# Patient Record
Sex: FEMALE | Race: WHITE | Employment: OTHER | URBAN - METROPOLITAN AREA
[De-identification: names, ages, dates, MRNs, and addresses within clinical notes are randomized per-mention and may not be internally consistent; named-entity substitution may affect disease eponyms.]

---

## 2020-04-25 ENCOUNTER — APPOINTMENT (OUTPATIENT)
Dept: GENERAL RADIOLOGY | Age: 75
End: 2020-04-25
Payer: MEDICARE

## 2020-04-25 ENCOUNTER — HOSPITAL ENCOUNTER (EMERGENCY)
Age: 75
Discharge: HOME | End: 2020-04-25
Attending: EMERGENCY MEDICINE
Payer: MEDICARE

## 2020-04-25 ENCOUNTER — APPOINTMENT (OUTPATIENT)
Dept: CT IMAGING | Age: 75
End: 2020-04-25
Payer: MEDICARE

## 2020-04-25 VITALS
DIASTOLIC BLOOD PRESSURE: 86 MMHG | WEIGHT: 220 LBS | RESPIRATION RATE: 14 BRPM | SYSTOLIC BLOOD PRESSURE: 145 MMHG | HEIGHT: 70 IN | OXYGEN SATURATION: 94 % | TEMPERATURE: 98 F | HEART RATE: 91 BPM | BODY MASS INDEX: 31.5 KG/M2

## 2020-04-25 LAB
ABSOLUTE EOS #: 0.15 K/UL (ref 0–0.44)
ABSOLUTE IMMATURE GRANULOCYTE: 0.02 K/UL (ref 0–0.3)
ABSOLUTE LYMPH #: 1.5 K/UL (ref 1.1–3.7)
ABSOLUTE MONO #: 0.61 K/UL (ref 0.1–1.2)
ALBUMIN SERPL-MCNC: 3.4 G/DL (ref 3.5–5.2)
ALBUMIN/GLOBULIN RATIO: ABNORMAL (ref 1–2.5)
ALP BLD-CCNC: 230 U/L (ref 35–104)
ALT SERPL-CCNC: 59 U/L (ref 5–33)
ANION GAP SERPL CALCULATED.3IONS-SCNC: 12 MMOL/L (ref 9–17)
AST SERPL-CCNC: 94 U/L
BASOPHILS # BLD: 1 % (ref 0–2)
BASOPHILS ABSOLUTE: 0.08 K/UL (ref 0–0.2)
BILIRUB SERPL-MCNC: 0.42 MG/DL (ref 0.3–1.2)
BUN BLDV-MCNC: 8 MG/DL (ref 8–23)
BUN/CREAT BLD: 8 (ref 9–20)
CALCIUM SERPL-MCNC: 9.1 MG/DL (ref 8.6–10.4)
CHLORIDE BLD-SCNC: 98 MMOL/L (ref 98–107)
CHP ED QC CHECK: NORMAL
CO2: 21 MMOL/L (ref 20–31)
CREAT SERPL-MCNC: 0.96 MG/DL (ref 0.5–0.9)
D-DIMER QUANTITATIVE: 0.59 MG/L FEU (ref 0–0.59)
DIFFERENTIAL TYPE: ABNORMAL
EOSINOPHILS RELATIVE PERCENT: 2 % (ref 1–4)
GFR AFRICAN AMERICAN: >60 ML/MIN
GFR NON-AFRICAN AMERICAN: 57 ML/MIN
GFR SERPL CREATININE-BSD FRML MDRD: ABNORMAL ML/MIN/{1.73_M2}
GFR SERPL CREATININE-BSD FRML MDRD: ABNORMAL ML/MIN/{1.73_M2}
GLUCOSE BLD-MCNC: 320 MG/DL
GLUCOSE BLD-MCNC: 320 MG/DL (ref 65–105)
GLUCOSE BLD-MCNC: 435 MG/DL (ref 70–99)
HCT VFR BLD CALC: 40.3 % (ref 36.3–47.1)
HEMOGLOBIN: 13.3 G/DL (ref 11.9–15.1)
IMMATURE GRANULOCYTES: 0 %
LACTATE DEHYDROGENASE: 182 U/L (ref 135–214)
LYMPHOCYTES # BLD: 21 % (ref 24–43)
MCH RBC QN AUTO: 33.1 PG (ref 25.2–33.5)
MCHC RBC AUTO-ENTMCNC: 33 G/DL (ref 28.4–34.8)
MCV RBC AUTO: 100.2 FL (ref 82.6–102.9)
MONOCYTES # BLD: 9 % (ref 3–12)
MYOGLOBIN: 38 NG/ML (ref 25–58)
MYOGLOBIN: 39 NG/ML (ref 25–58)
NRBC AUTOMATED: 0 PER 100 WBC
PDW BLD-RTO: 14.1 % (ref 11.8–14.4)
PLATELET # BLD: 134 K/UL (ref 138–453)
PLATELET ESTIMATE: ABNORMAL
PMV BLD AUTO: 10.6 FL (ref 8.1–13.5)
POTASSIUM SERPL-SCNC: 5 MMOL/L (ref 3.7–5.3)
RBC # BLD: 4.02 M/UL (ref 3.95–5.11)
RBC # BLD: ABNORMAL 10*6/UL
SEG NEUTROPHILS: 67 % (ref 36–65)
SEGMENTED NEUTROPHILS ABSOLUTE COUNT: 4.69 K/UL (ref 1.5–8.1)
SODIUM BLD-SCNC: 131 MMOL/L (ref 135–144)
TOTAL PROTEIN: 7.2 G/DL (ref 6.4–8.3)
TROPONIN INTERP: NORMAL
TROPONIN INTERP: NORMAL
TROPONIN T: NORMAL NG/ML
TROPONIN T: NORMAL NG/ML
TROPONIN, HIGH SENSITIVITY: <6 NG/L (ref 0–14)
TROPONIN, HIGH SENSITIVITY: <6 NG/L (ref 0–14)
WBC # BLD: 7.1 K/UL (ref 3.5–11.3)
WBC # BLD: ABNORMAL 10*3/UL

## 2020-04-25 PROCEDURE — 93005 ELECTROCARDIOGRAM TRACING: CPT | Performed by: PHYSICIAN ASSISTANT

## 2020-04-25 PROCEDURE — 83874 ASSAY OF MYOGLOBIN: CPT

## 2020-04-25 PROCEDURE — 2580000003 HC RX 258: Performed by: EMERGENCY MEDICINE

## 2020-04-25 PROCEDURE — 80053 COMPREHEN METABOLIC PANEL: CPT

## 2020-04-25 PROCEDURE — 83615 LACTATE (LD) (LDH) ENZYME: CPT

## 2020-04-25 PROCEDURE — 6370000000 HC RX 637 (ALT 250 FOR IP): Performed by: PHYSICIAN ASSISTANT

## 2020-04-25 PROCEDURE — 85379 FIBRIN DEGRADATION QUANT: CPT

## 2020-04-25 PROCEDURE — 71045 X-RAY EXAM CHEST 1 VIEW: CPT

## 2020-04-25 PROCEDURE — 96374 THER/PROPH/DIAG INJ IV PUSH: CPT

## 2020-04-25 PROCEDURE — 99285 EMERGENCY DEPT VISIT HI MDM: CPT

## 2020-04-25 PROCEDURE — 82947 ASSAY GLUCOSE BLOOD QUANT: CPT

## 2020-04-25 PROCEDURE — 84484 ASSAY OF TROPONIN QUANT: CPT

## 2020-04-25 PROCEDURE — 85025 COMPLETE CBC W/AUTO DIFF WBC: CPT

## 2020-04-25 PROCEDURE — 36415 COLL VENOUS BLD VENIPUNCTURE: CPT

## 2020-04-25 PROCEDURE — 6370000000 HC RX 637 (ALT 250 FOR IP): Performed by: EMERGENCY MEDICINE

## 2020-04-25 RX ORDER — ASPIRIN 81 MG/1
162 TABLET, CHEWABLE ORAL ONCE
Status: COMPLETED | OUTPATIENT
Start: 2020-04-25 | End: 2020-04-25

## 2020-04-25 RX ORDER — 0.9 % SODIUM CHLORIDE 0.9 %
80 INTRAVENOUS SOLUTION INTRAVENOUS ONCE
Status: DISCONTINUED | OUTPATIENT
Start: 2020-04-25 | End: 2020-04-25 | Stop reason: HOSPADM

## 2020-04-25 RX ORDER — MORPHINE SULFATE 4 MG/ML
4 INJECTION, SOLUTION INTRAMUSCULAR; INTRAVENOUS ONCE
Status: DISCONTINUED | OUTPATIENT
Start: 2020-04-25 | End: 2020-04-25

## 2020-04-25 RX ORDER — ONDANSETRON 2 MG/ML
4 INJECTION INTRAMUSCULAR; INTRAVENOUS ONCE
Status: DISCONTINUED | OUTPATIENT
Start: 2020-04-25 | End: 2020-04-25

## 2020-04-25 RX ORDER — SODIUM CHLORIDE 0.9 % (FLUSH) 0.9 %
10 SYRINGE (ML) INJECTION PRN
Status: DISCONTINUED | OUTPATIENT
Start: 2020-04-25 | End: 2020-04-25 | Stop reason: HOSPADM

## 2020-04-25 RX ORDER — ACETAMINOPHEN 500 MG
1000 TABLET ORAL ONCE
Status: COMPLETED | OUTPATIENT
Start: 2020-04-25 | End: 2020-04-25

## 2020-04-25 RX ORDER — 0.9 % SODIUM CHLORIDE 0.9 %
1000 INTRAVENOUS SOLUTION INTRAVENOUS ONCE
Status: COMPLETED | OUTPATIENT
Start: 2020-04-25 | End: 2020-04-25

## 2020-04-25 RX ADMIN — ACETAMINOPHEN 1000 MG: 500 TABLET ORAL at 15:48

## 2020-04-25 RX ADMIN — INSULIN HUMAN 4 UNITS: 100 INJECTION, SOLUTION PARENTERAL at 17:29

## 2020-04-25 RX ADMIN — SODIUM CHLORIDE 1000 ML: 9 INJECTION, SOLUTION INTRAVENOUS at 17:21

## 2020-04-25 RX ADMIN — ASPIRIN 81 MG 162 MG: 81 TABLET ORAL at 15:47

## 2020-04-25 SDOH — HEALTH STABILITY: MENTAL HEALTH: HOW OFTEN DO YOU HAVE A DRINK CONTAINING ALCOHOL?: NEVER

## 2020-04-25 ASSESSMENT — PAIN SCALES - GENERAL
PAINLEVEL_OUTOF10: 9
PAINLEVEL_OUTOF10: 9

## 2020-04-25 ASSESSMENT — PAIN DESCRIPTION - FREQUENCY: FREQUENCY: CONTINUOUS

## 2020-04-25 ASSESSMENT — PAIN DESCRIPTION - DESCRIPTORS: DESCRIPTORS: ACHING;THROBBING;HEADACHE

## 2020-04-25 ASSESSMENT — PAIN DESCRIPTION - ONSET: ONSET: ON-GOING

## 2020-04-25 ASSESSMENT — PAIN DESCRIPTION - ORIENTATION: ORIENTATION: LEFT;LOWER

## 2020-04-25 ASSESSMENT — PAIN DESCRIPTION - PROGRESSION: CLINICAL_PROGRESSION: NOT CHANGED

## 2020-04-25 ASSESSMENT — PAIN DESCRIPTION - LOCATION: LOCATION: CHEST;BACK

## 2020-04-25 ASSESSMENT — PAIN DESCRIPTION - PAIN TYPE: TYPE: ACUTE PAIN

## 2020-04-25 NOTE — ED NOTES
Refusing anymore IV starts. Request to gohome. IV put in by Abby Sanders was nonfuctional and was Dc'D as it came out in CT.      Constance Valadez RN  04/25/20 8401

## 2020-04-25 NOTE — ED PROVIDER NOTES
[address] [place] [address] ED  [facility]      Pt Name: Raeann Blount  MRN: [id number]  [place] 1945  Date ofevaluation: 4/25/2020  Provider: Libra Robert Dr       Chief Complaint   Patient presents with    Chest Pain     left sided throbbing cp x 2 days    Back Pain     lower back x 2 days    Headache         HISTORY OF PRESENT ILLNESS  (Location/Symptom, Timing/Onset, Context/Setting, Quality, Duration, Modifying Factors, Severity.)   Raeann Blount is a 66 y.o. female who presents to the emergency department with left-sided chest pain that is throbbing for the last 2 days. No fevers or chills. No nausea vomiting. Patient reports feeling thirsty having a headache as well. No abdominal pain. Reports history of blood clot around 10 or 11 years ago in her legs and long status post Whipple procedure. Nursing Notes were reviewed. ALLERGIES     Patient has no known allergies. CURRENT MEDICATIONS       Discharge Medication List as of 4/25/2020  6:50 PM      CONTINUE these medications which have NOT CHANGED    Details   Levothyroxine Sodium (SYNTHROID PO) Take by mouthHistorical Med      TRAMADOL HCL PO Take by mouth. Historical Med      Insulin Glargine (LANTUS SC) Inject into the skinHistorical Med             PAST MEDICAL HISTORY         Diagnosis Date    Diabetes mellitus ([address]     Thyroid disease        SURGICAL HISTORY           Procedure Laterality Date    APPENDECTOMY           FAMILY HISTORY     History reviewed. No pertinent family history. No family status information on file. SOCIAL HISTORY      reports that she has never smoked. She has never used smokeless tobacco. She reports that she does not drink alcohol or use drugs. REVIEW OFSYSTEMS    (2-9 systems for level 4, 10 or more for level 5)   Review of Systems    Except as noted above the remainder of the review of systems was reviewed and negative.      PHYSICAL EXAM    (up to 7 for level 4, 8 or more for level 5)     ED Triage Vitals [04/25/20 1507]   BP Temp Temp Source Pulse Resp SpO2 Height Weight   (!) 158/86 98 °F (36.7 °C) Oral 93 16 99 % 5' 10\" (1.778 m) 220 lb (99.8 kg)      Physical Exam  Constitutional:       Appearance: She is well-developed. HENT:      Head: Normocephalic and atraumatic. Neck:      Musculoskeletal: Normal range of motion and neck supple. Cardiovascular:      Rate and Rhythm: Normal rate and regular rhythm. Pulmonary:      Effort: Pulmonary effort is normal.      Breath sounds: Normal breath sounds. Abdominal:      Palpations: Abdomen is soft. Musculoskeletal: Normal range of motion. Skin:     General: Skin is warm. Findings: No rash. Neurological:      Mental Status: She is alert and oriented to person, place, and time. Psychiatric:         Behavior: Behavior normal.                 DIAGNOSTIC RESULTS     EKG: All EKG's are interpreted by the Emergency Department Physician who either signs or Co-signs this chart in the absence of a cardiologist.  Time [phone]. Normal sinus rhythm. 92 bpm.  No ST segment elevation.       RADIOLOGY:   Non-plain film images such as CT, Ultrasound and MRI are read by the radiologist. Plain radiographic images arevisualized and preliminarily interpreted by the emergency physician with the below findings:        Interpretation per the Radiologist below, if available at thetime of this note:          ED BEDSIDE ULTRASOUND:   Performed by ED Physician - none    LABS:  Labs Reviewed   COMPREHENSIVE METABOLIC PANEL - Abnormal; Notable for the following components:       Result Value    Glucose 435 (*)     CREATININE 0.96 (*)     Bun/Cre Ratio 8 (*)     Sodium 131 (*)     Alkaline Phosphatase 230 (*)     ALT 59 (*)     AST 94 (*)     Alb 3.4 (*)     GFR Non- 57 (*)     All other components within normal limits   CBC WITH AUTO DIFFERENTIAL - Abnormal; Notable for the following components: Platelets 423 (*)     Seg Neutrophils 67 (*)     Lymphocytes 21 (*)     All other components within normal limits   POC GLUCOSE FINGERSTICK - Abnormal; Notable for the following components:    POC Glucose 320 (*)     All other components within normal limits   POCT GLUCOSE - Normal   D-DIMER, QUANTITATIVE   TROP/MYOGLOBIN   TROP/MYOGLOBIN   LACTATE DEHYDROGENASE       All other labs were within normal range or not returned as of this dictation. EMERGENCY DEPARTMENT COURSE and DIFFERENTIAL DIAGNOSIS/MDM:   Vitals:    Vitals:    04/25/20 1520 04/25/20 1544 04/25/20 1814 04/25/20 1842   BP: 132/89 129/88  (!) 145/86   Pulse: 92 93 95 91   Resp: 12 14 15 14   Temp:       TempSrc:       SpO2:  98%  94%   Weight:       Height:         6:12 PM EDT  Patient SIGNED Santos Richard. CT pending. CONSULTS:  None    PROCEDURES:  Procedures        FINAL IMPRESSION      1. Chest pain, unspecified type          DISPOSITION/PLAN   DISPOSITION        PATIENTREFERRED TO:   Luis CRUZ/Dejan Ovalle.  [address]  [place] [id number]  [phone]    Schedule an appointment as soon as possible for a visit in 1 week        DISCHARGE MEDICATIONS:     Discharge Medication List as of 4/25/2020  6:50 PM              (Please note that portions of this note were completed with a voice recognition program.  Efforts were made to edit thedictations but occasionally words are mis-transcribed.)    GURPREET Marie PA-C  04/25/20 [address]896 [facility]  04/28/20 1018

## 2020-04-25 NOTE — ED PROVIDER NOTES
EMERGENCY DEPARTMENT ENCOUNTER   ATTENDING ATTESTATION     Pt Name: Aman Borges  MRN: [id number]  [place] 1945  Date of evaluation: 4/25/20   Aman Borges is a 66 y.o. female with CC: Chest Pain (left sided throbbing cp x 2 days); Back Pain (lower back x 2 days); and Headache    MDM:     75-year-old female coming to the emergency department with chest pain for 1 day. Given the description of the pain this is likely more musculoskeletal however patient does have history of pulmonary embolism and elevated d-dimer here in the emergency department. There was some difficulty getting correct IV access for CTA and patient ended up declining CTA due to IV access difficulty. Patient ended up leaving [address] I did discuss with her risks of leaving without proper diagnosis including but not limited to respiratory failure cardiac arrest and death. Patient is alert oriented and capable of making her own decisions. CRITICAL CARE:       EKG: All EKG's are interpreted by the Emergency Department Physician who either signs or Co-signs this chart in the absence of a cardiologist.  EKG- sinus with rate 92  Unremarkable EKG    RADIOLOGY:All plain film, CT, MRI, and formal ultrasound images (except ED bedside ultrasound) are read by the radiologist, see reports below, unless otherwise noted in MDM or here. XR CHEST PORTABLE   Final Result   Vague peripheral opacities in the bases are nonspecific but could be seen the   setting of atypical infection. LABS: All lab results were reviewed by myself, and all abnormals are listed below. Labs Reviewed   COMPREHENSIVE METABOLIC PANEL   CBC WITH AUTO DIFFERENTIAL   D-DIMER, QUANTITATIVE   TROP/MYOGLOBIN   TROP/MYOGLOBIN   TROP/MYOGLOBIN   LACTATE DEHYDROGENASE     CONSULTS:  None  FINAL IMPRESSION    No diagnosis found. PASTMEDICAL HISTORY   No past medical history on file.   SURGICAL HISTORY     No past surgical history on

## 2020-04-27 LAB
EKG ATRIAL RATE: 92 BPM
EKG P AXIS: 43 DEGREES
EKG P-R INTERVAL: 164 MS
EKG Q-T INTERVAL: 336 MS
EKG QRS DURATION: 84 MS
EKG QTC CALCULATION (BAZETT): 415 MS
EKG R AXIS: 74 DEGREES
EKG T AXIS: 31 DEGREES
EKG VENTRICULAR RATE: 92 BPM

## 2020-09-15 ENCOUNTER — HOSPITAL ENCOUNTER (EMERGENCY)
Age: 75
Discharge: HOME | End: 2020-09-15
Attending: EMERGENCY MEDICINE
Payer: MEDICARE

## 2020-09-15 VITALS
DIASTOLIC BLOOD PRESSURE: 68 MMHG | BODY MASS INDEX: 28.73 KG/M2 | TEMPERATURE: 97.8 F | SYSTOLIC BLOOD PRESSURE: 143 MMHG | OXYGEN SATURATION: 98 % | RESPIRATION RATE: 20 BRPM | HEIGHT: 69 IN | WEIGHT: 194 LBS | HEART RATE: 104 BPM

## 2020-09-15 PROCEDURE — 99282 EMERGENCY DEPT VISIT SF MDM: CPT

## 2020-09-15 PROCEDURE — 6370000000 HC RX 637 (ALT 250 FOR IP): Performed by: EMERGENCY MEDICINE

## 2020-09-15 RX ORDER — ONDANSETRON 4 MG/1
4 TABLET, ORALLY DISINTEGRATING ORAL ONCE
Status: DISCONTINUED | OUTPATIENT
Start: 2020-09-15 | End: 2020-09-15

## 2020-09-15 RX ORDER — ONDANSETRON 4 MG/1
4 TABLET, ORALLY DISINTEGRATING ORAL ONCE
Status: COMPLETED | OUTPATIENT
Start: 2020-09-15 | End: 2020-09-15

## 2020-09-15 RX ORDER — OXYCODONE HYDROCHLORIDE AND ACETAMINOPHEN 5; 325 MG/1; MG/1
1 TABLET ORAL ONCE
Status: COMPLETED | OUTPATIENT
Start: 2020-09-15 | End: 2020-09-15

## 2020-09-15 RX ORDER — ONDANSETRON 4 MG/1
4 TABLET, FILM COATED ORAL 3 TIMES DAILY PRN
Qty: 15 TABLET | Refills: 0 | Status: SHIPPED | OUTPATIENT
Start: 2020-09-15

## 2020-09-15 RX ORDER — OXYCODONE HYDROCHLORIDE AND ACETAMINOPHEN 5; 325 MG/1; MG/1
1 TABLET ORAL EVERY 6 HOURS PRN
Qty: 12 TABLET | Refills: 0 | Status: SHIPPED | OUTPATIENT
Start: 2020-09-15 | End: 2020-09-18

## 2020-09-15 RX ADMIN — ONDANSETRON 4 MG: 4 TABLET, ORALLY DISINTEGRATING ORAL at 00:43

## 2020-09-15 RX ADMIN — OXYCODONE HYDROCHLORIDE AND ACETAMINOPHEN 1 TABLET: 5; 325 TABLET ORAL at 00:43

## 2020-09-15 ASSESSMENT — PAIN SCALES - GENERAL
PAINLEVEL_OUTOF10: 10
PAINLEVEL_OUTOF10: 10

## 2020-09-15 NOTE — ED PROVIDER NOTES
EMERGENCY DEPARTMENT ENCOUNTER    Pt Name: Elías Rodriguez  MRN: [id number]  [place] 1945  Date of evaluation: 9/15/20  CHIEF COMPLAINT       Chief Complaint   Patient presents with    Rash     HISTORY OF PRESENT ILLNESS   Patient is a 57-year-old female with PMH of lung cancer currently receiving radiation therapy who presents to the ED complaining of neck rash from radiation treatments. She has had mild skin eruptions around neck from previous treatments however this is more severe, more painful. Last radiation treatment was 3 weeks ago. She has follow-up ointment tomorrow with her oncologist.  She does not have fever, cough, shortness of breath, stridor, chest pain, abdominal pain, nausea vomiting, changes in urine or stool. REVIEW OF SYSTEMS     Review of Systems   All other systems reviewed and are negative. PASTMEDICAL HISTORY     Past Medical History:   Diagnosis Date    Diabetes mellitus ([address]     Thyroid disease      SURGICAL HISTORY       Past Surgical History:   Procedure Laterality Date    APPENDECTOMY       CURRENT MEDICATIONS       Previous Medications    INSULIN GLARGINE (LANTUS SC)    Inject into the skin    LEVOTHYROXINE SODIUM (SYNTHROID PO)    Take by mouth    TRAMADOL HCL PO    Take by mouth. ALLERGIES     has No Known Allergies. FAMILY HISTORY     has no family status information on file. SOCIAL HISTORY       Social History     Tobacco Use    Smoking status: Never Smoker    Smokeless tobacco: Never Used   Substance Use Topics    Alcohol use: Never     Frequency: Never    Drug use: Never     PHYSICAL EXAM     INITIAL VITALS: BP (!) 143/68   Pulse 104   Temp 97.8 °F (36.6 °C) (Oral)   Resp 20   Ht 5' 9\" (1.753 m)   Wt 194 lb (88 kg)   SpO2 98%   BMI 28.65 kg/m²    Physical Exam  Constitutional:       Appearance: Normal appearance. HENT:      Head: Normocephalic.       Right Ear: External ear normal.      Left Ear: External ear normal.      Mouth/Throat: Mouth: Mucous membranes are moist.   Eyes:      Conjunctiva/sclera: Conjunctivae normal.   Neck:      Musculoskeletal: Normal range of motion. Cardiovascular:      Rate and Rhythm: Normal rate. Pulmonary:      Effort: Pulmonary effort is normal.   Abdominal:      General: Abdomen is flat. Skin:     General: Skin is dry. Comments: First-degree burn around anterior aspect of neck. Neurological:      Mental Status: She is alert and oriented to person, place, and time. Mental status is at baseline. Psychiatric:         Mood and Affect: Mood normal.         Behavior: Behavior normal.         MEDICAL DECISION MAKING:   The patient is hemodynamically stable, afebrile, nontoxic-appearing. Physical exam notable for first-degree burn anterior surface of neck secondary to radiation treatment. Based on history and exam patient will require analgesia and close follow-up. ED plan for Rx for Percocet, Rx for Zofran, discharge. DIAGNOSTIC RESULTS   EKG:All EKG's are interpreted by the Emergency Department Physician who either signs or Co-signs this chart in the absence of a cardiologist.        RADIOLOGY:All plain film, CT, MRI, and formal ultrasound images (except ED bedside ultrasound) are read by the radiologist, see reports below, unless otherwisenoted in MDM or here. No orders to display     LABS: All lab results were reviewed by myself, and all abnormals are listed below.   Labs Reviewed - No data to display    EMERGENCY DEPARTMENTCOURSE:         Vitals:    Vitals:    09/15/20 0023   BP: (!) 143/68   Pulse: 104   Resp: 20   Temp: 97.8 °F (36.6 °C)   TempSrc: Oral   SpO2: 98%   Weight: 194 lb (88 kg)   Height: 5' 9\" (1.753 m)       The patient was given the following medications while in the emergency department:  Orders Placed This Encounter   Medications    oxyCODONE-acetaminophen (PERCOCET) 5-325 MG per tablet 1 tablet    ondansetron (ZOFRAN-ODT) disintegrating tablet 4 mg    oxyCODONE-acetaminophen (PERCOCET) 5-325 MG per tablet     Sig: Take 1 tablet by mouth every 6 hours as needed for Pain for up to 3 days. Intended supply: 3 days. Take lowest dose possible to manage pain     Dispense:  12 tablet     Refill:  0    DISCONTD: ondansetron (ZOFRAN-ODT) disintegrating tablet 4 mg    ondansetron (ZOFRAN) 4 MG tablet     Sig: Take 1 tablet by mouth 3 times daily as needed for Nausea or Vomiting     Dispense:  15 tablet     Refill:  0     CONSULTS:  None    FINAL IMPRESSION      1. Rash and other nonspecific skin eruption          DISPOSITION/PLAN   DISPOSITION Decision To Discharge 09/15/2020 12:39:45 AM      PATIENT REFERRED TO:  Dudley CRUZ/Dejan Ovalle. [address]  [address]  [phone]    In 2 days      DISCHARGE MEDICATIONS:  New Prescriptions    ONDANSETRON (ZOFRAN) 4 MG TABLET    Take 1 tablet by mouth 3 times daily as needed for Nausea or Vomiting    OXYCODONE-ACETAMINOPHEN (PERCOCET) 5-325 MG PER TABLET    Take 1 tablet by mouth every 6 hours as needed for Pain for up to 3 days. Intended supply: 3 days.  Take lowest dose possible to manage pain     Paulino Medlelin MD  Attending Emergency Physician                    Antonella Quiles MD  09/15/20 0946